# Patient Record
Sex: FEMALE | Race: BLACK OR AFRICAN AMERICAN | Employment: PART TIME | URBAN - METROPOLITAN AREA
[De-identification: names, ages, dates, MRNs, and addresses within clinical notes are randomized per-mention and may not be internally consistent; named-entity substitution may affect disease eponyms.]

---

## 2021-04-10 ENCOUNTER — HOSPITAL ENCOUNTER (EMERGENCY)
Age: 41
Discharge: HOME | End: 2021-04-10
Attending: EMERGENCY MEDICINE
Payer: MEDICAID

## 2021-04-10 VITALS
SYSTOLIC BLOOD PRESSURE: 118 MMHG | DIASTOLIC BLOOD PRESSURE: 69 MMHG | OXYGEN SATURATION: 98 % | WEIGHT: 160 LBS | TEMPERATURE: 98.3 F | BODY MASS INDEX: 27.31 KG/M2 | HEIGHT: 64 IN | HEART RATE: 94 BPM | RESPIRATION RATE: 18 BRPM
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
## 2021-04-10 DIAGNOSIS — S16.1XXA STRAIN OF NECK MUSCLE, INITIAL ENCOUNTER: ICD-10-CM

## 2021-04-10 PROCEDURE — 99283 EMERGENCY DEPT VISIT LOW MDM: CPT

## 2021-04-10 RX ORDER — IBUPROFEN 800 MG/1
800 TABLET ORAL
Qty: 20 TAB | Refills: 0 | Status: SHIPPED | OUTPATIENT
Start: 2021-04-10 | End: 2021-04-17

## 2021-04-10 RX ORDER — CYCLOBENZAPRINE HCL 10 MG
10 TABLET ORAL
Qty: 12 TAB | Refills: 0 | Status: SHIPPED | OUTPATIENT
Start: 2021-04-10
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
## 2021-04-10 NOTE — DISCHARGE INSTRUCTIONS
It was a pleasure taking care of you in our Emergency Department today. We know that when you come to [facility], you are entrusting us with your health, comfort, and safety. Our physicians and nurses honor that trust, and truly appreciate the opportunity to care for you and your loved ones. We also value your feedback. If you receive a survey about your Emergency Department experience today, please fill it out. We care about our patients' feedback, and we listen to what you have to say. Thank you!

## 2021-04-10 NOTE — Clinical Note
[address] EMERGENCY DEPT 
[address] [place] [id number] 
[phone] Work/School Note Date: 4/10/2021 To Whom It May concern: 
 
Krysta Damian was seen and treated today in the emergency room by the following provider(s): 
Attending Provider: Chelsea Wylie MD 
Nurse Practitioner: Jagjit Miles NP. Krysta Damian is excused from work/school on 4/10/2021 through 4/13/2021. She is medically clear to return to work/school on 4/14/2021. Sincerely, Vasyl Stanford NP
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
## 2023-05-15 RX ORDER — CYCLOBENZAPRINE HCL 10 MG
10 TABLET ORAL 3 TIMES DAILY PRN
COMMUNITY
Start: 2021-04-10